# Patient Record
Sex: FEMALE | Race: BLACK OR AFRICAN AMERICAN
[De-identification: names, ages, dates, MRNs, and addresses within clinical notes are randomized per-mention and may not be internally consistent; named-entity substitution may affect disease eponyms.]

---

## 2019-12-02 NOTE — HPEPDOC
Obstetrical History & Physical


General


Date of Admission


Dec 2, 2019 at 09:07





History of Present Illness


Farhat is a 18yo  with SIUP at 39w3d by lmp c/w 9wk u/s who presents to 

L&D with chief complaint of contractions that started this morning. No loss of 

fluid, no vaginal bleeding. Has felt fetal movement. No f/c/n/v/CP/SOB.


Information Provided By:  Patient





Prenatal Care


Prenatal Care:  [facility]





Prenatal Dating


Final EDC:  Dec 6, 2019


Final EDC by:  LMP, 1st trimester (US)





Antepartum Course


Prenatal Diagnos(e)s


Obesity (starting BMI 32) with 35lb weight gain, size greater than dates 6 Nov 

with GS showing 49%ile, varicella non-immune


Height (inches):  61


Pre-Pregnancy weight (lbs.):  170


Admission Weight (lbs.):  205


Change in Weight (lbs.):  35





Past Medical History


Past Obstetrical History :  


   Past Obstetrical History:  Primgravida ( EAB,  SAB)


GYN History:  History of STD (hx of chlamydia)


Past Medical History


Medical History


asthma as a child, obesity (starting BMI 32)


Surgical History:  [place] teeth





Family History


Significant Family History:  No pertinent family hx





Social History


Marital Status:  


Family situation:  Spouse/partner home


Psychosocial History:  No pertinent psych hx


* Smoker:  non-smoker


Alcohol:  Denies


Drugs:  denies





Prenatal Imunizations


Tdap status:  current


Influenza Status:  current





Allergies


Coded Allergies:  


     No Known Allergies (Unverified , 19)





Medications


Scheduled


Prenatal No.137/Iron/Folic Acd (Prenatal Vitamin Tablet) 1 Each Tablet, 1 TAB PO

DAILY





Physical Examination


Physical Examination


GENERAL: Alert and oriented times three.


ABDOMEN: Gravid and non-tender to touch.


FETUS: Is vertex (VTX) by sterile vaginal examination (SVE)


EXTREMITIES: No edema.





Vital Signs/I&O





Vital Signs








  Date Time  Temp Pulse Resp B/P (MAP) Pulse Ox O2 Delivery O2 Flow Rate FiO2


 


19 08:07  74 18 114/63 (80)    


 


19 07:48 98.3    99   











Laboratory Data


24H LABS


Laboratory Tests 2


19 09:15: Serology Scanned Report Hepatitis B Testing


19 09:54: 


CBC/BMP








Pertinent Laboratoy Data


Blood Type:  A+


RBC Antibody Screen:  Negative


HIV:  Negative


Hepatitis B:  Negative


Hepatitis C:  Unknown


Rapid Plasma Reagin:  Nonreactive


Rubella:  Immune


Varicella:  Nonreactive


Chlamydia/Gonorrhea:  Negative


Group B Streptococcus:  Negative


Glucose Tolerance Test:  109





Anatomy Ultrasound


Ultrasound Date:  2019


Placenta Location:  Anterior


Normal Anatomy:  Yes


Placenta Previa:  No





 Steroid Therapy


 Steroid Therapy:  No





Vaginal Examination


Dilation:  1cm


Effacement:  other (thick )


Station:  -3


Cervical Consistency:  Firm


Cervical Position:  Posterior


Fetal Presentation:  Cephalic presentation





Fetal Assessment


Fetal Heart Rate (FHR):  150


Variability:  Minimal to moderate


Accelerations:  Positive


Decelerations:  Late (very occasional )





Tocometer


Contractions:  Yes


Frequency:  greater than 10 min/apart


Strength:  palpated as mild





Assessment/Plan


Assessment


Farhat is a 18yo  with SIUP at 39w3d by lmp c/w 9wk u/s with no evidence 

of labor, but during routine NST was noted to have subtle late FHR decels and 

min-mod crystal. Given her gestational age, I recommend we proceed with 

augmentation/induction which patient is amenable to. SCE 2/75/-2. Cervical koo

bulb placed with 40cc NS. Cat II FHRT with min-mod crystal, +accels, very occasional

late decel. Ctx q10min. Cephalic by SCE. GBS negative. 





Prenatal course/PMhx significant for: Obesity (starting BMI 32) with 35lb weight

gain, size greater than dates 6 Nov with GS showing 49%ile, varicella non-

immune, asthma as a child





Plan


Admit and orient.


 and consent.


Diet: clear liquids


Group B Streptococcus (GBS) negative


Labs and intravenous (IV) per unit protocol.


Counseled on koo bulb, pitocin and induction of labor (IOL).


Lactated Ringers (LR): Bolus 1000 mL, then at 125 mL/hr.


Anticipate normal spontaneous delivery ()


Candidate for epidural as desired in active labor, IV stadol with reassuring 

FHRT prior





MD Vianey Harper Katrina D MD            Dec 2, 2019 10:22

## 2019-12-02 NOTE — IPNPDOC
Text Note


Date of Service


The patient was seen on 12/2/19.





NOTE


Intrapartum Note





Farhat is doing well, coping with discomfort of her ctx. Had 2nd dose of 2mg IV 

stadol about 2 hours ago. A bit nauseous currently. 


RN reports koo bulb fell out earlier in the afternoon and pt had SROM, clear 

that was nitrazine positive with continued leaking.


Pitocin has been titrated up per protocol. 





Vitals wnl, afebrile





Cat I FHRT currently with mod crystal, +accels, early decels


[place]: ctx q3-4min





SCE: 6/80/-2, rupture of forebag with exam and lightly stained mec fluid noted





Patient now desiring epidural, anesthesia notified


Will continue to titrate pitocin per protocol


Pediatrician notified of presence of meconium


Safe to proceed





Dr. Lana Winters MD





VS,Alvarez, I+O


VS, Alvarez, I+O


Laboratory Tests


12/2/19 09:54











Vital Signs








  Date Time  Temp Pulse Resp B/P (MAP) Pulse Ox O2 Delivery O2 Flow Rate FiO2


 


12/2/19 17:07  74 18 117/66 (83)    


 


12/2/19 16:40 98.5       


 


12/2/19 16:06      Room Air  


 


12/2/19 07:48     99   

















Lana Winters MD            Dec 2, 2019 18:12

## 2019-12-03 NOTE — IPNPDOC
Postpartum Progress Note


Date of Service:  Dec 3, 2019


Postpartum Day#:  1


Postpartum Progress Note


PPD 1





SUBJECT: Farhat is a 18yo [id number] s/p  late last night that was 

complicated by PPH with EBL 700ml addressed with IV pitocin/IM methergine/TX 

cytotec and Bakri balloon(100cc), doing well postpartum day # 1. She has not yet

ambulated, has koo in place draining dilute clear urine, and tolerating 

regular diet. Breast feeding without issue. With Bakri still in place, she feels

slight cramping. No fevers/chills. She received 1 dose of IV Unasyn after 

delivery for uterine sweeps. No f/c/n/v/CP/SOB. 





OBJECTIVE: 


VITAL SIGNS: Within normal limits, afebrile.


Alert and oriented times three.


Abdomen: Fundus firm at U-2. Soft, NTTP.


Extremities: no pain with palpation of calves





Bakri output 75cc since placement





Labs:


Admission H/H: 9.8/30.8


H/H 6hr after delivery: .3





ASSESSMENT: Farhat is a 18yo [id number] s/p  late last night that was 

complicated by PPH with EBL 700ml addressed with IV pitocin/IM methergine/TX 

cytotec and Bakri balloon(100cc), doing well postpartum day # 1. Vitals within 

normal limits, afebrile, hemodynamically stable with no evidence of infection. 

Minimal output of Bakri and uterus remains firm with no bleeding around Bakri. 





PLAN:


1. Routine pp care


2. Discussed plan for removal of Bakri with oncoming provider. Bakri has 100cc 

of NS in it, will reduce incrementally. Koo catheter can be removed with 

removal of Bakri.


3. Tylenol and Motrin for pain.


4. Encourage breast feeding and ambulation


5. Regular diet





Dr. Lana Winters MD





VS, I&O, 24H, Fishbone


Vital Signs/I&O





Vital Signs








  Date Time  Temp Pulse Resp B/P (MAP) Pulse Ox O2 Delivery O2 Flow Rate FiO2


 


12/3/19 05:46 99.4 86 16 120/71 (87) 99 Room Air  














I&O- Last 24 Hours up to 6 AM 


 


 12/3/19





 06:00


 


Intake Total 4620 ml


 


Output Total 4326 ml


 


Balance 294 ml











Laboratory Data


24H LABS


Laboratory Tests 2


19 09:54: 


Nucleated Red Blood Cells % (auto) 0.0, Glomerular Filtration Rate > 60.0, Uric 

Acid 3.7, Total Bilirubin 0.2, Aspartate Amino Transf (AST/SGOT) 13, Alanine 

Aminotransferase (ALT/SGPT) 22, Lactate Dehydrogenase 185, Syphilis Serology N

ONREACTIVE


12/3/19 06:22: Nucleated Red Blood Cells % (auto) 0.0


CBC/BMP


Laboratory Tests


19 09:54








12/3/19 06:22

















Lana Winters MD            Dec 3, 2019 09:57

## 2019-12-03 NOTE — DNPDOC
[facility] Delivery Note


Delivery Note


DATE OF DELIVERY: 2 Dec 2019





PREDELIVERY DIAGNOSIS: 39w3d gestation with NRFHT





POST DELIVERY DIAGNOSIS: 


Term gestation delivered


Postpartum hemorrhage





PROCEDURE: Spontaneous vaginal delivery





OBSTETRICIAN: Dr. Lana Winters MD





ANESTHESIA: epidural





ESTIMATED BLOOD LOSS: 750 mL.





FINDINGS: 7 pound 10 ounce (3470g) female infant, Apgar Score 8/9





DELIVERY SUMMARY:


Farhat is a 20yo [id number] s/p  at 39w3d after undergoing IOL for NRFHT seen

when she presented for labor check. She delivered at 22:49 on 2 Dec 2019. 

Induction was started with koo bulb and pitocin. She progressed well and had 

SROM after receiving an epidural. Amniotic fluid was seen to be slightly 

meconium stained when a forebag was ruptured on a subsequent exam. When she 

reached C/C/0, she began pushing. Fetal head delivered OA and restituted JENELLE. 

There was a right compound hand. Anterior shoulder delivered followed by 

posterior shoulder and corpus. Infant had terminal meconium but immediately 

vigorous, placed on maternal chest where infant's mouth and nose were suctioned 

with bulb suction. Cord was clamped x2 and cut by FOB, then infant was taken to 

warmer for further suctioning. Small abrasion noted on the inner left labia, 

repaired with a figure of 8 using 4-0 vicryl. With uterine massage and traction 

on the umbilical cord, placenta delivered. There were some trailing membranes 

that seemed to be easily retrieved just inside the vagina. However, despite IV 

pitocin being given per protocol and further bimanual massage, patient continued

to have heavy uterine bleeding. I performed manual sweeps that retrieved small 

amounts of very adherent and very small pieces of placenta/membranes, but the 

patient continued bleeding. She was given 0.2mg IM methergine. More uterine 

sweeps were performed but heavy bleeding persisted in the setting of atony. I 

then called for bakri balloon and placed the balloon within the uterus with 

100cc of NS filling the balloon- this, and having some time for the uterotonics 

to act, stopped the patient's bleeding. The bakri was attached to a koo bag. 

Indwelling koo bladder catheter was replaced. I finally placed 1000mcg of 

cytotec rectally for continued prophylaxis against future bleeding. When I left 

the room, mom was doing well. Infant was taken to NICU for observation after 

needing further suctioning after delivery. Patient will receive a dose of unasyn

for the uterine sweeps she received. Plan to perform CBC in 6 hours. Plan to 

remove fluid from Bakri incrementally. 





MD Vianey Harper Katrina D MD            Dec 3, 2019 01:55

## 2019-12-04 NOTE — DSES
DATE OF ADMISSION:  2019

DATE OF DISCHARGE:  2019

 

This lady is a 19-year-old  3 now para 1 admitted with contractions at 39

and 3 weeks of gestation, had a spontaneous vaginal delivery with epidural in

place of female 7 pounds 10 ounces, 3470 grams, Apgar scores of eight and nine at

1 and 5 minutes respectively.  She had a postpartum hemorrhage requiring a Bakri

balloon as well as Methergine and Syntocinon.   Her hemoglobin initially was 9.8,

hematocrit 30.8 and platelets were 293.  Postpartum day 1 hemoglobin 8.0,

hematocrit 25.3 and platelets 244.  She did not require any transfusions.  She

was asymptomatic.  The Bakri was removed and she has no further bleeding.

 

Blood pressure this morning is 139/75, respirations 16, pulse 91, temperature is

98.7.   We discussed phlebitis, cystitis, mastitis, endometritis, cellulitis,

diet, exercise, pain management, perineal, breast and wound care.  Her meds are

dispensed at [place] which she has already picked up.  She plans on taking the

baby to [place].

 

The rest of the examination is unremarkable.  Normocephalic, atraumatic.  Neck

full range of motion.  Pupils equal and reactive to light.  Distal pulses are

symmetric.  No evidence of deep vein thrombosis (DVT), pulmonary embolism (PE),

or superficial phlebitis.  Chest is clear bilaterally to bases.  No wheezes or

rhonchi.  No CVA tenderness.  Abdomen soft.  Uterus 2 below.  Lochia is moderate.

Four quadrant bowel sounds are noted.  Perineum is intact.  She has no rashes,

lesions or pruritus.  No arthralgia, myalgia.  No complaint joint pain.  No

complaint cough, wheeze, or shortness of breath or dyspnea on exertion.  She has

no nausea, vomiting, diarrhea or constipation.

 

In summary we have a term gestation delivered a live birth female infant with a

postpartum hemorrhage of 750.  No replacement was required.  The patient has a

followup 6 weeks postpartum at [facility] and her medications were dispensed at

[place], which she is already picked up.

## 2021-02-19 ENCOUNTER — HOSPITAL ENCOUNTER (EMERGENCY)
Dept: HOSPITAL 53 - M ED | Age: 21
Discharge: HOME | End: 2021-02-19
Payer: SELF-PAY

## 2021-02-19 VITALS — BODY MASS INDEX: 33.3 KG/M2 | WEIGHT: 176.37 LBS | HEIGHT: 61 IN

## 2021-02-19 VITALS — DIASTOLIC BLOOD PRESSURE: 76 MMHG | SYSTOLIC BLOOD PRESSURE: 129 MMHG

## 2021-02-19 DIAGNOSIS — S93.412A: Primary | ICD-10-CM
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
## 2021-02-19 NOTE — CCD
Summarization Of Episode

                             Created on: 2021



GINNA FORBES

External Reference #: [id number]

: 2000

Sex: Female



Demographics





                          Address                   [address]

[place]  [address]

 

                          Home Phone                [phone]

 

                          Preferred Language        Unknown

 

                          Marital Status            

 

                          [place] Affiliation     Roman Catholic

 

                          Race                      Black or 

 

                          Ethnic Group              Not  or 





Author





                          Author                    [facility]

 

                          Organization              [facility]

 

                          Address                   Unknown

 

                          Phone                     Unavailable







Support





                Name            Relationship    Address         Phone

 

                    CYSSFTDRUM          Next Of Kin         [address] [place]  [address] [phone]

 

                UE              Next Of Kin     Unknown         Unavailable

 

                    RENZO FORBES    Next Of Kin         [address] [place]  [address] [phone]







Care Team Providers





                    Care Team Member Name Role                Phone

 

                    Feola, T Tonia PA   Unavailable         Unavailable

 

                    Feola, T Tonia PA   Unavailable         Unavailable

 

                    Feola, T Tonia PA   Unavailable         Unavailable

 

                    Feola, T Tonia PA   Unavailable         Unavailable

 

                    Feola, T Tonia PA   Unavailable         Unavailable

 

                    Feola, T Tonia PA   Unavailable         Unavailable

 

                    Feola, T Tonia PA   Unavailable         Unavailable

 

                    Feola, T Tonia PA   Unavailable         Unavailable

 

                    Feola, T Tonia PA   Unavailable         Unavailable

 

                    Feola, T Tonia PA   Unavailable         Unavailable

 

                    Feola, T Tonia PA   Unavailable         Unavailable

 

                    Feola, T Tonia PA   Unavailable         Unavailable

 

                    Feola, T Tonia PA   Unavailable         Unavailable

 

                    Feola, T Tonia PA   Unavailable         Unavailable

 

                    Feola, T Tonia PA   Unavailable         Unavailable

 

                    Feola, T Tonia PA   Unavailable         Unavailable

 

                    Feola, T Tonia PA   Unavailable         Unavailable

 

                    Feola, T Tonia PA   Unavailable         Unavailable

 

                    Feola, T Tonia PA   Unavailable         Unavailable

 

                    Feola, T Tonia PA   Unavailable         Unavailable

 

                    Feola, T Tonia PA   Unavailable         Unavailable

 

                    Feola, T Tonia PA   Unavailable         Unavailable

 

                    Feola, T Tonia PA   Unavailable         Unavailable

 

                    Feola, T Tonia PA   Unavailable         Unavailable

 

                    Feola, T Tonia PA   Unavailable         Unavailable

 

                    Feola, T Tonia PA   Unavailable         Unavailable

 

                    Feola, T Tonia PA   Unavailable         Unavailable

 

                    Feola, T Tonia PA   Unavailable         Unavailable

 

                    Feola, T Tonia PA   Unavailable         Unavailable

 

                    Feola, T Tonia PA   Unavailable         Unavailable

 

                    Feola, T Tonia PA   Unavailable         Unavailable

 

                    Feola, T Tonia PA   Unavailable         Unavailable

 

                    Feola, T Tonia PA   Unavailable         Unavailable

 

                    Feola, T Tonia PA   Unavailable         Unavailable

 

                    Feola, T Tonia PA   Unavailable         Unavailable

 

                    Feola, T Tonia PA   Unavailable         Unavailable

 

                    Feola, T Tonia PA   Unavailable         Unavailable



                                  



Re-disclosure Warning

          The records that you are about to access may contain information from 
federally-assisted alcohol or drug abuse programs. If such information is 
present, then the following federally mandated warning applies: This information
has been disclosed to you from records protected by federal confidentiality 
rules (42 CFR part 2). The federal rules prohibit you from making any further 
disclosure of this information unless further disclosure is expressly permitted 
by the written consent of the person to whom it pertains or as otherwise 
permitted by 42 CFR part 2. A general authorization for the release of medical 
or other information is NOT sufficient for this purpose. The Federal rules 
restrict any use of the information to criminally investigate or prosecute any 
alcohol or drug abuse patient.The records that you are about to access may 
contain highly sensitive health information, the redisclosure of which is 
protected by Article 27-F of the [facility] Public Health law. If you 
continue you may have access to information: Regarding HIV / AIDS; Provided by 
facilities licensed or operated by the [facility] Office of Mental Health; 
or Provided by the [facility] Office for People With Developmental 
Disabilities. If such information is present, then the following [place] 
mandated warning applies: This information has been disclosed to you from 
confidential records which are protected by state law. State law prohibits you 
from making any further disclosure of this information without the specific 
written consent of the person to whom it pertains, or as otherwise permitted by 
law. Any unauthorized further disclosure in violation of state law may result in
a fine or [employer] sentence or both. A general authorization for the release of 
medical or other information is NOT sufficient authorization for further disc
losure.                                                                         
    



Encounters

          



           Encounter  Providers  Location   Date       Indications Data Source(s

)

 

                Outpatient      Attender: Tonia MCKNIGHT                 

021 09:48:54 AM EST - 2021 

11:22:24 AM EST                                     [facility] ([facility]

)



                                                                    



Insurance Providers

          



             Payer name   Policy type / Coverage type Policy ID    Covered party

 ID Covered 

party's relationship to hull Policy [place]             Plan Information

 

          [facility]           [id number]                      

       [id number]

 

          [facility]           [id number]           [facility]        

         [id number]

 

          [facility]/ [id number]           Self      

          [id number]

 

          [employer]- Needs Payer Match           [id number]           Self           

     [id number]

 

          SELF PAY ONLY           [id number]           SP                  [id number]

[id number]

 

          SELF PAY  O         UNAVAILABLE           S                   UNAVAILA

BLE



                                                                                
                                                         



Results

          



                    ID                  Date                Data Source

 

                    [id number]       2021 12:00:00 AM EST [facility]









          Name      Value     Range     Interpretation Code Description Data Elisabet

rce(s) Supporting 

Document(s)

 

          Carest[facility] Rapid COVID Antigen Test Positive                           

     [facility]     

 

                                        This lab was reported by Cu[facility] serrano. 









                    ID                  Date                Data Source

 

                    [id number]            01/15/2021 12:00:00 AM EST [facility]









          Name      Value     Range     Interpretation Code Description Data Elisabet

rce(s) Supporting 

Document(s)

 

                                        SARS coronavirus 2 RNA [Presence] in Res

piratory specimen by KRYSTAL with probe 

detection  NEGATIVE                                    [facility]      

 

                                        This lab was ordered by Cuauhtemoc Freeman and reported by [email or web address]. 









                    ID                  Date                Data Source

 

                    [id number]       01/15/2021 12:00:00 AM EST [facility]









          Name      Value     Range     Interpretation Code Description Data Elisabet

rce(s) Supporting 

Document(s)

 

          Carestart Rapid COVID Antigen Test Negative                           

     [facility]     

 

                                        This lab was reported by [facility]  Smiley serrano. 







                                        Procedure

## 2021-02-19 NOTE — REP
INDICATION:

pain and swelling after fall.



COMPARISON:

None.



TECHNIQUE:

Four views of the left ankle.



FINDINGS:

Ankle mortise is intact.  There is anterolateral soft tissue swelling moderate in

degree.  No fracture is seen.  No subluxation noted.  Joint spaces are preserved.



IMPRESSION:

No fracture noted.  Anterolateral soft tissue swelling.





<Electronically signed by Thomas Jain > 02/19/21 [id number]

## 2024-04-05 ENCOUNTER — APPOINTMENT (OUTPATIENT)
Dept: OBGYN | Age: 24
End: 2024-04-05

## 2024-04-17 ENCOUNTER — APPOINTMENT (OUTPATIENT)
Dept: FAMILY MEDICINE | Age: 24
End: 2024-04-17

## 2024-04-17 VITALS
TEMPERATURE: 97.4 F | DIASTOLIC BLOOD PRESSURE: 82 MMHG | HEART RATE: 74 BPM | RESPIRATION RATE: 15 BRPM | OXYGEN SATURATION: 99 % | BODY MASS INDEX: 33.99 KG/M2 | SYSTOLIC BLOOD PRESSURE: 118 MMHG | WEIGHT: 180.01 LBS | HEIGHT: 61 IN

## 2024-04-17 DIAGNOSIS — R68.89 SENSATION OF FEELING COLD: ICD-10-CM

## 2024-04-17 DIAGNOSIS — F41.0 PANIC ATTACKS: ICD-10-CM

## 2024-04-17 DIAGNOSIS — F41.9 ANXIETY: ICD-10-CM

## 2024-04-17 DIAGNOSIS — Z00.00 ANNUAL PHYSICAL EXAM: Primary | ICD-10-CM

## 2024-04-17 DIAGNOSIS — Z23 NEED FOR VACCINATION: ICD-10-CM

## 2024-04-17 RX ORDER — LORAZEPAM 0.5 MG/1
0.5 TABLET ORAL 2 TIMES DAILY PRN
Qty: 10 TABLET | Refills: 0 | Status: SHIPPED | OUTPATIENT
Start: 2024-04-17

## 2024-04-17 RX ORDER — ESCITALOPRAM OXALATE 5 MG/1
TABLET ORAL
Qty: 53 TABLET | Refills: 0 | Status: SHIPPED | OUTPATIENT
Start: 2024-04-17 | End: 2024-05-17

## 2024-04-17 ASSESSMENT — PATIENT HEALTH QUESTIONNAIRE - PHQ9
1. LITTLE INTEREST OR PLEASURE IN DOING THINGS: NOT AT ALL
SUM OF ALL RESPONSES TO PHQ9 QUESTIONS 1 AND 2: 1
CLINICAL INTERPRETATION OF PHQ2 SCORE: NO FURTHER SCREENING NEEDED
SUM OF ALL RESPONSES TO PHQ9 QUESTIONS 1 AND 2: 1
2. FEELING DOWN, DEPRESSED OR HOPELESS: SEVERAL DAYS

## 2024-04-17 ASSESSMENT — ANXIETY QUESTIONNAIRES
3. WORRYING TOO MUCH ABOUT DIFFERENT THINGS: 3
6. BECOMING EASILY ANNOYED OR IRRITABLE: 2
4. TROUBLE RELAXING: SEVERAL DAYS
6. BECOMING EASILY ANNOYED OR IRRITABLE: MORE THAN HALF THE DAYS
GAD7 TOTAL SCORE: 14
3. WORRYING TOO MUCH ABOUT DIFFERENT THINGS: NEARLY EVERY DAY
1. FEELING NERVOUS, ANXIOUS, OR ON EDGE: NEARLY EVERY DAY
1. FEELING NERVOUS, ANXIOUS, OR ON EDGE: 3
2. NOT BEING ABLE TO STOP OR CONTROL WORRYING: NEARLY EVERY DAY
7. FEELING AFRAID AS IF SOMETHING AWFUL MIGHT HAPPEN: NOT AT ALL
4. TROUBLE RELAXING: 1
7. FEELING AFRAID AS IF SOMETHING AWFUL MIGHT HAPPEN: 0
5. BEING SO RESTLESS THAT IT IS HARD TO SIT STILL: 2
5. BEING SO RESTLESS THAT IT IS HARD TO SIT STILL: MORE THAN HALF THE DAYS
2. NOT BEING ABLE TO STOP OR CONTROL WORRYING: 3

## 2024-04-18 ENCOUNTER — TELEPHONE (OUTPATIENT)
Dept: FAMILY MEDICINE | Age: 24
End: 2024-04-18

## 2024-04-22 ENCOUNTER — APPOINTMENT (OUTPATIENT)
Dept: LAB | Age: 24
End: 2024-04-22

## 2024-04-22 DIAGNOSIS — D64.9 ANEMIA, UNSPECIFIED TYPE: Primary | ICD-10-CM

## 2024-04-22 DIAGNOSIS — Z00.00 ANNUAL PHYSICAL EXAM: ICD-10-CM

## 2024-04-22 LAB
BASOPHILS # BLD: 0 K/MCL (ref 0–0.3)
BASOPHILS NFR BLD: 1 %
DEPRECATED RDW RBC: 41.1 FL (ref 39–50)
EOSINOPHIL # BLD: 0.3 K/MCL (ref 0–0.5)
EOSINOPHIL NFR BLD: 7 %
ERYTHROCYTE [DISTWIDTH] IN BLOOD: 11.9 % (ref 11–15)
HBA1C MFR BLD: 5.3 % (ref 4.5–5.6)
HCT VFR BLD CALC: 36.9 % (ref 36–46.5)
HGB BLD-MCNC: 11.9 G/DL (ref 12–15.5)
IMM GRANULOCYTES # BLD AUTO: 0 K/MCL (ref 0–0.2)
IMM GRANULOCYTES # BLD: 0 %
LYMPHOCYTES # BLD: 2.6 K/MCL (ref 1–4.8)
LYMPHOCYTES NFR BLD: 54 %
MCH RBC QN AUTO: 30.5 PG (ref 26–34)
MCHC RBC AUTO-ENTMCNC: 32.2 G/DL (ref 32–36.5)
MCV RBC AUTO: 94.6 FL (ref 78–100)
MONOCYTES # BLD: 0.4 K/MCL (ref 0.3–0.9)
MONOCYTES NFR BLD: 9 %
NEUTROPHILS # BLD: 1.3 K/MCL (ref 1.8–7.7)
NEUTROPHILS NFR BLD: 29 %
NRBC BLD MANUAL-RTO: 0 /100 WBC
PLATELET # BLD AUTO: 304 K/MCL (ref 140–450)
RBC # BLD: 3.9 MIL/MCL (ref 4–5.2)
WBC # BLD: 4.7 K/MCL (ref 4.2–11)

## 2024-04-22 PROCEDURE — 80050 GENERAL HEALTH PANEL: CPT | Performed by: CLINICAL MEDICAL LABORATORY

## 2024-04-22 PROCEDURE — 83550 IRON BINDING TEST: CPT | Performed by: CLINICAL MEDICAL LABORATORY

## 2024-04-22 PROCEDURE — 83540 ASSAY OF IRON: CPT | Performed by: CLINICAL MEDICAL LABORATORY

## 2024-04-22 PROCEDURE — 82728 ASSAY OF FERRITIN: CPT | Performed by: CLINICAL MEDICAL LABORATORY

## 2024-04-22 PROCEDURE — 80061 LIPID PANEL: CPT | Performed by: CLINICAL MEDICAL LABORATORY

## 2024-04-22 PROCEDURE — 83036 HEMOGLOBIN GLYCOSYLATED A1C: CPT | Performed by: CLINICAL MEDICAL LABORATORY

## 2024-04-22 PROCEDURE — 36415 COLL VENOUS BLD VENIPUNCTURE: CPT | Performed by: INTERNAL MEDICINE

## 2024-04-23 ENCOUNTER — TELEPHONE (OUTPATIENT)
Dept: FAMILY MEDICINE | Age: 24
End: 2024-04-23

## 2024-04-23 LAB
ALBUMIN SERPL-MCNC: 4.1 G/DL (ref 3.6–5.1)
ALBUMIN/GLOB SERPL: 1.2 {RATIO} (ref 1–2.4)
ALP SERPL-CCNC: 50 UNITS/L (ref 45–117)
ALT SERPL-CCNC: 25 UNITS/L
ANION GAP SERPL CALC-SCNC: 8 MMOL/L (ref 7–19)
AST SERPL-CCNC: 19 UNITS/L
BILIRUB SERPL-MCNC: 0.6 MG/DL (ref 0.2–1)
BUN SERPL-MCNC: 10 MG/DL (ref 6–20)
BUN/CREAT SERPL: 13 (ref 7–25)
CALCIUM SERPL-MCNC: 9.2 MG/DL (ref 8.4–10.2)
CHLORIDE SERPL-SCNC: 109 MMOL/L (ref 97–110)
CHOLEST SERPL-MCNC: 222 MG/DL
CHOLEST/HDLC SERPL: 2.1 {RATIO}
CO2 SERPL-SCNC: 26 MMOL/L (ref 21–32)
CREAT SERPL-MCNC: 0.79 MG/DL (ref 0.51–0.95)
EGFRCR SERPLBLD CKD-EPI 2021: >90 ML/MIN/{1.73_M2}
FASTING DURATION TIME PATIENT: 12 HOURS (ref 0–999)
FERRITIN SERPL-MCNC: 119 NG/ML (ref 8–252)
GLOBULIN SER-MCNC: 3.3 G/DL (ref 2–4)
GLUCOSE SERPL-MCNC: 87 MG/DL (ref 70–99)
HDLC SERPL-MCNC: 105 MG/DL
IRON SATN MFR SERPL: 34 % (ref 15–45)
IRON SERPL-MCNC: 102 MCG/DL (ref 50–170)
LDLC SERPL CALC-MCNC: 111 MG/DL
NONHDLC SERPL-MCNC: 117 MG/DL
POTASSIUM SERPL-SCNC: 4.1 MMOL/L (ref 3.4–5.1)
PROT SERPL-MCNC: 7.4 G/DL (ref 6.4–8.2)
SODIUM SERPL-SCNC: 139 MMOL/L (ref 135–145)
TIBC SERPL-MCNC: 300 MCG/DL (ref 250–450)
TRIGL SERPL-MCNC: 28 MG/DL
TSH SERPL-ACNC: 1.05 MCUNITS/ML (ref 0.35–5)

## 2024-04-24 ENCOUNTER — TELEPHONE (OUTPATIENT)
Dept: FAMILY MEDICINE | Age: 24
End: 2024-04-24

## 2024-04-24 DIAGNOSIS — D64.9 ANEMIA, UNSPECIFIED TYPE: Primary | ICD-10-CM

## 2024-04-26 ENCOUNTER — OFFICE VISIT (OUTPATIENT)
Dept: OBGYN | Age: 24
End: 2024-04-26

## 2024-04-26 VITALS
WEIGHT: 173.72 LBS | HEART RATE: 82 BPM | DIASTOLIC BLOOD PRESSURE: 70 MMHG | BODY MASS INDEX: 32.82 KG/M2 | SYSTOLIC BLOOD PRESSURE: 118 MMHG | OXYGEN SATURATION: 97 %

## 2024-04-26 DIAGNOSIS — Z01.419 WELL WOMAN EXAM WITH ROUTINE GYNECOLOGICAL EXAM: Primary | ICD-10-CM

## 2024-04-26 DIAGNOSIS — Z12.4 CERVICAL CANCER SCREENING: ICD-10-CM

## 2024-04-26 PROCEDURE — 88175 CYTOPATH C/V AUTO FLUID REDO: CPT | Performed by: INTERNAL MEDICINE

## 2024-04-26 PROCEDURE — 87624 HPV HI-RISK TYP POOLED RSLT: CPT | Performed by: CLINICAL MEDICAL LABORATORY

## 2024-04-29 ENCOUNTER — TELEPHONE (OUTPATIENT)
Dept: OBGYN | Age: 24
End: 2024-04-29

## 2024-04-29 DIAGNOSIS — Z30.430 ENCOUNTER FOR IUD INSERTION: Primary | ICD-10-CM

## 2024-05-01 LAB
CASE RPRT: NORMAL
CYTOLOGY CVX/VAG STUDY: NORMAL
HPV16+18+45 E6+E7MRNA CVX NAA+PROBE: NEGATIVE
Lab: NORMAL
PAP EDUCATIONAL NOTE: NORMAL
SPECIMEN ADEQUACY: NORMAL

## 2024-05-16 ENCOUNTER — APPOINTMENT (OUTPATIENT)
Dept: FAMILY MEDICINE | Age: 24
End: 2024-05-16

## 2024-05-16 ENCOUNTER — APPOINTMENT (OUTPATIENT)
Dept: OBGYN | Age: 24
End: 2024-05-16

## 2024-05-30 ENCOUNTER — APPOINTMENT (OUTPATIENT)
Dept: OBGYN | Age: 24
End: 2024-05-30

## 2024-06-10 ENCOUNTER — NURSE ONLY (OUTPATIENT)
Dept: OBGYN | Age: 24
End: 2024-06-10

## 2024-06-13 ENCOUNTER — APPOINTMENT (OUTPATIENT)
Dept: OBGYN | Age: 24
End: 2024-06-13

## 2024-06-13 ENCOUNTER — E-ADVICE (OUTPATIENT)
Dept: OBGYN | Age: 24
End: 2024-06-13

## 2024-06-13 ENCOUNTER — NURSE ONLY (OUTPATIENT)
Dept: OBGYN | Age: 24
End: 2024-06-13

## 2024-06-13 ENCOUNTER — TELEPHONE (OUTPATIENT)
Dept: OBGYN | Age: 24
End: 2024-06-13

## 2024-06-13 VITALS
DIASTOLIC BLOOD PRESSURE: 82 MMHG | OXYGEN SATURATION: 98 % | HEIGHT: 61 IN | SYSTOLIC BLOOD PRESSURE: 100 MMHG | HEART RATE: 72 BPM | BODY MASS INDEX: 32.66 KG/M2 | WEIGHT: 173 LBS

## 2024-06-13 DIAGNOSIS — Z97.5 IUD (INTRAUTERINE DEVICE) IN PLACE: Primary | ICD-10-CM

## 2024-06-13 DIAGNOSIS — Z30.430 ENCOUNTER FOR IUD INSERTION: Primary | ICD-10-CM

## 2024-06-13 PROCEDURE — 76998 US GUIDE INTRAOP: CPT | Performed by: OBSTETRICS & GYNECOLOGY

## 2024-06-13 PROCEDURE — 58301 REMOVE INTRAUTERINE DEVICE: CPT

## 2024-06-13 PROCEDURE — 58300 INSERT INTRAUTERINE DEVICE: CPT

## 2024-06-18 ENCOUNTER — APPOINTMENT (OUTPATIENT)
Dept: BEHAVIORAL HEALTH | Age: 24
End: 2024-06-18

## 2024-06-18 DIAGNOSIS — F41.1 GENERALIZED ANXIETY DISORDER: Primary | ICD-10-CM

## 2024-06-18 PROCEDURE — 90791 PSYCH DIAGNOSTIC EVALUATION: CPT | Performed by: SOCIAL WORKER

## 2024-06-30 DIAGNOSIS — F41.9 ANXIETY: ICD-10-CM

## 2024-06-30 DIAGNOSIS — F41.0 PANIC ATTACKS: ICD-10-CM

## 2024-07-09 RX ORDER — ESCITALOPRAM OXALATE 10 MG/1
10 TABLET ORAL DAILY
Qty: 10 TABLET | Refills: 0 | Status: SHIPPED | OUTPATIENT
Start: 2024-07-09

## 2024-07-10 DIAGNOSIS — F41.9 ANXIETY: ICD-10-CM

## 2024-07-10 DIAGNOSIS — F41.0 PANIC ATTACKS: ICD-10-CM

## 2024-07-15 ENCOUNTER — APPOINTMENT (OUTPATIENT)
Dept: OBGYN | Age: 24
End: 2024-07-15

## 2024-07-15 VITALS
SYSTOLIC BLOOD PRESSURE: 90 MMHG | OXYGEN SATURATION: 100 % | HEART RATE: 82 BPM | HEIGHT: 61 IN | WEIGHT: 167 LBS | DIASTOLIC BLOOD PRESSURE: 64 MMHG | BODY MASS INDEX: 31.53 KG/M2

## 2024-07-15 DIAGNOSIS — Z30.431 IUD CHECK UP: Primary | ICD-10-CM

## 2024-07-15 PROCEDURE — 99212 OFFICE O/P EST SF 10 MIN: CPT

## 2024-07-15 RX ORDER — LORAZEPAM 0.5 MG/1
0.5 TABLET ORAL 2 TIMES DAILY PRN
Qty: 10 TABLET | Refills: 0 | Status: SHIPPED | OUTPATIENT
Start: 2024-07-15 | End: 2024-07-18 | Stop reason: SDUPTHER

## 2024-07-18 ENCOUNTER — APPOINTMENT (OUTPATIENT)
Dept: FAMILY MEDICINE | Age: 24
End: 2024-07-18

## 2024-07-18 VITALS
WEIGHT: 168.65 LBS | SYSTOLIC BLOOD PRESSURE: 118 MMHG | HEIGHT: 61 IN | OXYGEN SATURATION: 98 % | BODY MASS INDEX: 31.84 KG/M2 | TEMPERATURE: 97.2 F | HEART RATE: 61 BPM | DIASTOLIC BLOOD PRESSURE: 78 MMHG | RESPIRATION RATE: 16 BRPM

## 2024-07-18 DIAGNOSIS — F41.0 PANIC ATTACKS: Primary | ICD-10-CM

## 2024-07-18 DIAGNOSIS — F41.9 ANXIETY: ICD-10-CM

## 2024-07-18 PROCEDURE — 99214 OFFICE O/P EST MOD 30 MIN: CPT | Performed by: PHARMACIST

## 2024-07-18 PROCEDURE — G2211 COMPLEX E/M VISIT ADD ON: HCPCS | Performed by: PHARMACIST

## 2024-07-18 RX ORDER — ESCITALOPRAM OXALATE 10 MG/1
10 TABLET ORAL DAILY
Qty: 30 TABLET | Refills: 3 | Status: SHIPPED | OUTPATIENT
Start: 2024-07-18

## 2024-07-18 RX ORDER — LORAZEPAM 0.5 MG/1
0.5 TABLET ORAL 2 TIMES DAILY PRN
Qty: 20 TABLET | Refills: 0 | Status: SHIPPED | OUTPATIENT
Start: 2024-07-18

## 2024-07-18 ASSESSMENT — ANXIETY QUESTIONNAIRES
GAD7 TOTAL SCORE: 5
7. FEELING AFRAID AS IF SOMETHING AWFUL MIGHT HAPPEN: NOT AT ALL
IF YOU CHECKED OFF ANY PROBLEMS ON THIS QUESTIONNAIRE, HOW DIFFICULT HAVE THESE PROBLEMS MADE IT FOR YOU TO DO YOUR WORK, TAKE CARE OF THINGS AT HOME, OR GET ALONG WITH OTHER PEOPLE: SOMEWHAT DIFFICULT
6. BECOMING EASILY ANNOYED OR IRRITABLE: 1
6. BECOMING EASILY ANNOYED OR IRRITABLE: SEVERAL DAYS
5. BEING SO RESTLESS THAT IT IS HARD TO SIT STILL: 0
3. WORRYING TOO MUCH ABOUT DIFFERENT THINGS: SEVERAL DAYS
2. NOT BEING ABLE TO STOP OR CONTROL WORRYING: SEVERAL DAYS
4. TROUBLE RELAXING: 0
5. BEING SO RESTLESS THAT IT IS HARD TO SIT STILL: NOT AT ALL
7. FEELING AFRAID AS IF SOMETHING AWFUL MIGHT HAPPEN: 0
1. FEELING NERVOUS, ANXIOUS, OR ON EDGE: MORE THAN HALF THE DAYS
3. WORRYING TOO MUCH ABOUT DIFFERENT THINGS: 1
4. TROUBLE RELAXING: NOT AT ALL
1. FEELING NERVOUS, ANXIOUS, OR ON EDGE: 2
2. NOT BEING ABLE TO STOP OR CONTROL WORRYING: 1

## 2024-07-18 ASSESSMENT — PATIENT HEALTH QUESTIONNAIRE - PHQ9
SUM OF ALL RESPONSES TO PHQ9 QUESTIONS 1 AND 2: 1
SUM OF ALL RESPONSES TO PHQ9 QUESTIONS 1 AND 2: 1
CLINICAL INTERPRETATION OF PHQ2 SCORE: NO FURTHER SCREENING NEEDED
2. FEELING DOWN, DEPRESSED OR HOPELESS: SEVERAL DAYS
1. LITTLE INTEREST OR PLEASURE IN DOING THINGS: NOT AT ALL

## 2024-07-30 ENCOUNTER — APPOINTMENT (OUTPATIENT)
Dept: LAB | Age: 24
End: 2024-07-30

## 2024-07-30 DIAGNOSIS — D64.9 ANEMIA, UNSPECIFIED TYPE: ICD-10-CM

## 2024-07-30 LAB
BASOPHILS # BLD: 0 K/MCL (ref 0–0.3)
BASOPHILS NFR BLD: 0 %
DEPRECATED RDW RBC: 43.3 FL (ref 39–50)
EOSINOPHIL # BLD: 0.5 K/MCL (ref 0–0.5)
EOSINOPHIL NFR BLD: 9 %
ERYTHROCYTE [DISTWIDTH] IN BLOOD: 12.2 % (ref 11–15)
HCT VFR BLD CALC: 36.3 % (ref 36–46.5)
HGB BLD-MCNC: 11.6 G/DL (ref 12–15.5)
IMM GRANULOCYTES # BLD AUTO: 0 K/MCL (ref 0–0.2)
IMM GRANULOCYTES # BLD: 0 %
LYMPHOCYTES # BLD: 2.2 K/MCL (ref 1–4.8)
LYMPHOCYTES NFR BLD: 44 %
MCH RBC QN AUTO: 30.9 PG (ref 26–34)
MCHC RBC AUTO-ENTMCNC: 32 G/DL (ref 32–36.5)
MCV RBC AUTO: 96.5 FL (ref 78–100)
MONOCYTES # BLD: 0.5 K/MCL (ref 0.3–0.9)
MONOCYTES NFR BLD: 9 %
NEUTROPHILS # BLD: 1.9 K/MCL (ref 1.8–7.7)
NEUTROPHILS NFR BLD: 38 %
NRBC BLD MANUAL-RTO: 0 /100 WBC
PLATELET # BLD AUTO: 284 K/MCL (ref 140–450)
RBC # BLD: 3.76 MIL/MCL (ref 4–5.2)
WBC # BLD: 5 K/MCL (ref 4.2–11)

## 2024-07-30 PROCEDURE — 85025 COMPLETE CBC W/AUTO DIFF WBC: CPT | Performed by: CLINICAL MEDICAL LABORATORY

## 2024-07-30 PROCEDURE — 36415 COLL VENOUS BLD VENIPUNCTURE: CPT | Performed by: INTERNAL MEDICINE

## 2024-07-31 ENCOUNTER — TELEPHONE (OUTPATIENT)
Dept: FAMILY MEDICINE | Age: 24
End: 2024-07-31

## 2024-07-31 ENCOUNTER — APPOINTMENT (OUTPATIENT)
Dept: OBGYN | Age: 24
End: 2024-07-31

## 2024-08-13 ENCOUNTER — TELEPHONE (OUTPATIENT)
Dept: BEHAVIORAL HEALTH | Age: 24
End: 2024-08-13

## 2024-08-15 ENCOUNTER — TELEPHONE (OUTPATIENT)
Dept: OBGYN | Age: 24
End: 2024-08-15

## 2024-08-16 ENCOUNTER — APPOINTMENT (OUTPATIENT)
Dept: OBGYN | Age: 24
End: 2024-08-16

## 2024-08-20 ENCOUNTER — APPOINTMENT (OUTPATIENT)
Dept: OBGYN | Age: 24
End: 2024-08-20

## 2024-08-27 ENCOUNTER — E-ADVICE (OUTPATIENT)
Dept: FAMILY MEDICINE | Age: 24
End: 2024-08-27

## 2024-09-26 ENCOUNTER — E-ADVICE (OUTPATIENT)
Dept: FAMILY MEDICINE | Age: 24
End: 2024-09-26

## 2024-10-01 ENCOUNTER — E-ADVICE (OUTPATIENT)
Dept: FAMILY MEDICINE | Age: 24
End: 2024-10-01

## 2024-10-07 ENCOUNTER — APPOINTMENT (OUTPATIENT)
Dept: LAB | Age: 24
End: 2024-10-07

## 2024-10-07 DIAGNOSIS — D64.9 ANEMIA, UNSPECIFIED TYPE: ICD-10-CM

## 2024-10-07 LAB
BASOPHILS # BLD: 0 K/MCL (ref 0–0.3)
BASOPHILS NFR BLD: 1 %
DEPRECATED RDW RBC: 40.4 FL (ref 39–50)
EOSINOPHIL # BLD: 0.8 K/MCL (ref 0–0.5)
EOSINOPHIL NFR BLD: 13 %
ERYTHROCYTE [DISTWIDTH] IN BLOOD: 11.6 % (ref 11–15)
HCT VFR BLD CALC: 37.4 % (ref 36–46.5)
HGB BLD-MCNC: 12.3 G/DL (ref 12–15.5)
IMM GRANULOCYTES # BLD AUTO: 0 K/MCL (ref 0–0.2)
IMM GRANULOCYTES # BLD: 0 %
LYMPHOCYTES # BLD: 2.4 K/MCL (ref 1–4.8)
LYMPHOCYTES NFR BLD: 43 %
MCH RBC QN AUTO: 31.5 PG (ref 26–34)
MCHC RBC AUTO-ENTMCNC: 32.9 G/DL (ref 32–36.5)
MCV RBC AUTO: 95.7 FL (ref 78–100)
MONOCYTES # BLD: 0.5 K/MCL (ref 0.3–0.9)
MONOCYTES NFR BLD: 9 %
NEUTROPHILS # BLD: 1.9 K/MCL (ref 1.8–7.7)
NEUTROPHILS NFR BLD: 34 %
NRBC BLD MANUAL-RTO: 0 /100 WBC
PLATELET # BLD AUTO: 303 K/MCL (ref 140–450)
RBC # BLD: 3.91 MIL/MCL (ref 4–5.2)
WBC # BLD: 5.6 K/MCL (ref 4.2–11)

## 2024-10-07 PROCEDURE — 36415 COLL VENOUS BLD VENIPUNCTURE: CPT | Performed by: INTERNAL MEDICINE

## 2024-10-07 PROCEDURE — 82728 ASSAY OF FERRITIN: CPT | Performed by: CLINICAL MEDICAL LABORATORY

## 2024-10-07 PROCEDURE — 83540 ASSAY OF IRON: CPT | Performed by: CLINICAL MEDICAL LABORATORY

## 2024-10-07 PROCEDURE — 83550 IRON BINDING TEST: CPT | Performed by: CLINICAL MEDICAL LABORATORY

## 2024-10-07 PROCEDURE — 85025 COMPLETE CBC W/AUTO DIFF WBC: CPT | Performed by: CLINICAL MEDICAL LABORATORY

## 2024-10-08 ENCOUNTER — TELEPHONE (OUTPATIENT)
Dept: FAMILY MEDICINE | Age: 24
End: 2024-10-08

## 2024-10-08 LAB
FERRITIN SERPL-MCNC: 109 NG/ML (ref 8–252)
IRON SATN MFR SERPL: 26 % (ref 15–45)
IRON SERPL-MCNC: 74 MCG/DL (ref 50–170)
TIBC SERPL-MCNC: 290 MCG/DL (ref 250–450)

## 2024-10-17 ENCOUNTER — E-ADVICE (OUTPATIENT)
Dept: OBGYN | Age: 24
End: 2024-10-17

## 2024-10-22 ENCOUNTER — APPOINTMENT (OUTPATIENT)
Dept: FAMILY MEDICINE | Age: 24
End: 2024-10-22